# Patient Record
Sex: MALE | Race: WHITE | Employment: OTHER | URBAN - METROPOLITAN AREA
[De-identification: names, ages, dates, MRNs, and addresses within clinical notes are randomized per-mention and may not be internally consistent; named-entity substitution may affect disease eponyms.]

---

## 2019-04-25 ENCOUNTER — OFFICE VISIT (OUTPATIENT)
Dept: ORTHOPEDIC SURGERY | Age: 84
End: 2019-04-25

## 2019-04-25 VITALS
HEIGHT: 70 IN | WEIGHT: 184 LBS | TEMPERATURE: 97.6 F | BODY MASS INDEX: 26.34 KG/M2 | OXYGEN SATURATION: 98 % | RESPIRATION RATE: 16 BRPM | SYSTOLIC BLOOD PRESSURE: 126 MMHG | HEART RATE: 100 BPM | DIASTOLIC BLOOD PRESSURE: 83 MMHG

## 2019-04-25 DIAGNOSIS — M54.59 MECHANICAL LOW BACK PAIN: ICD-10-CM

## 2019-04-25 DIAGNOSIS — M54.16 LUMBAR RADICULOPATHY: ICD-10-CM

## 2019-04-25 DIAGNOSIS — Z96.642 HISTORY OF LEFT HIP REPLACEMENT: Primary | ICD-10-CM

## 2019-04-25 DIAGNOSIS — M25.552 PAIN IN LEFT HIP: ICD-10-CM

## 2019-04-25 NOTE — PROGRESS NOTES
Patient: Micha Keita                MRN: [id number]       SSN: [id number] YOB: 1930        AGE: 80 y.o. SEX: male Body mass index is 26.4 kg/m². PCP: Adam Giraldo MD 
04/25/19 HISTORY:  I had the pleasure to see the patient. It has been a couple of years_______________. Clinically he has some spinal stenosis with radiculopathy. He has had a revision of the femoral component, occasionally has some thigh pain. He reports that as the day goes on, the legs get a little bit weaker. The physical therapy that you ordered really helped him quite a bit. PHYSICAL EXAMINATION:  He denies much in the way of start-up pain. I watched him walk today. Mild Trendelenburg gait. Mildly ataxic as well, but not severely so. No true start-up discomfort. The hips rotate adequately. He is able to cross his legs actually. He has a couple small abrasions over the trochanter. It looks like it is healing adequately and there is no evidence for infection or DVT. He had an anterior ganglion cyst at one point and the incision he showed me looks like it is well-healed as well. RADIOGRAPHS:  X-rays shows that the revision, likely Ken implants, are stable. PLAN:  He has fairly significant neuropathy. It is a little worse on the left than on the right and I suspect he has radiculopathy and spinal stenosis and foraminal stenosis. I have offered an MRI for his back and some treatment. He would like to just see how things go. I would expect some activity-related thigh pain for longer distances, however the implants are not loose and I would expect some radiculopathy and some weakness due to his spinal stenosis. If he would like an MRI and some treatment for it, I would be happy to arrange it. He would like to see how things go. I'll see him back in a years' time. If he would like some help with the back and radiculopathy we can certainly help him sooner. REVIEW OF SYSTEMS:   
 
CON: negative for weight loss, fever EYE: negative for double vision ENT: negative for hoarseness RS:   negative for Tb 
GI:    negative for blood in stool :  negative for blood in urine Other systems reviewed and noted below. Past Medical History:  
Diagnosis Date  Appendicitis 1963  Hyperlipemia  Hypertension Family History Problem Relation Age of Onset  Elevated Lipids Mother  Cancer Father  Elevated Lipids Sister Current Outpatient Medications Medication Sig Dispense Refill  lisinopril (PRINIVIL, ZESTRIL) 20 mg tablet Take  by mouth daily.  FLUTICASONE PROPIONATE (FLUTICASONE NA) by Nasal route.  carvedilol (COREG) 3.125 mg tablet Take  by mouth two (2) times daily (with meals).  ezetimibe (ZETIA) 10 mg tablet Take  by mouth.  niacin 250 mg tablet Take 250 mg by mouth two (2) times a day. 3 tabs in the am and 3 tabs in the pm    
 atorvastatin (LIPITOR) 40 mg tablet Take  by mouth daily.  esomeprazole (NEXIUM) 40 mg capsule Take  by mouth daily.  multivitamin (ONE A DAY) tablet Take 1 Tab by mouth daily.  CALCIUM CITRATE (CITRACAL PO) Take  by mouth daily. With vitamin d 500 and calcium 400mg  VIT C/VIT E AC/LUT/COPPER/ZINC (PRESERVISION LUTEIN PO) Take  by mouth. Allergies Allergen Reactions  Penicillins Contact Dermatitis  Sulfa (Sulfonamide Antibiotics) Rash Past Surgical History:  
Procedure Laterality Date  HX BACK SURGERY  2003, 1/2013, 4/2013  
 lower back surgery [address] [address] [facility]  HX HIP REPLACEMENT Left 02/02/1991  
 HX MOHS PROCEDURES Left [facility] Hip exploratory surgery, thigh  [address] mendez tissue removed from base of skull  HX OTHER SURGICAL Left 1989  
 i & d of left hip wound  HX TURP  [id number] Social History Socioeconomic History  Marital status:  Spouse name: Not on file  Number of children: Not on file  Years of education: Not on file  Highest education level: Not on file Occupational History  Not on file Social Needs  Financial resource strain: Not on file  Food insecurity:  
  Worry: Not on file Inability: Not on file  Transportation needs:  
  Medical: Not on file Non-medical: Not on file Tobacco Use  Smoking status: Never Smoker  Smokeless tobacco: Never Used Substance and Sexual Activity  Alcohol use: Yes Alcohol/week: 1.2 oz Types: 2 Glasses of wine per week Comment: daily  Drug use: No  
 Sexual activity: Not on file Lifestyle  Physical activity:  
  Days per week: Not on file Minutes per session: Not on file  Stress: Not on file Relationships  Social connections:  
  Talks on phone: Not on file Gets together: Not on file Attends Alevism service: Not on file Active member of club or organization: Not on file Attends meetings of clubs or organizations: Not on file Relationship status: Not on file  Intimate partner violence:  
  Fear of current or ex partner: Not on file Emotionally abused: Not on file Physically abused: Not on file Forced sexual activity: Not on file Other Topics Concern  Not on file Social History Narrative  Not on file Visit Vitals /83 Pulse 100 Temp 97.6 °F (36.4 °C) (Oral) Resp 16 Ht 5' 10\" (1.778 m) Wt 184 lb (83.5 kg) SpO2 98% BMI 26.40 kg/m² PHYSICAL EXAMINATION: 
GENERAL: Alert and oriented x3, in no acute distress, well-developed, well-nourished, afebrile. HEART: No JVD. EYES: No scleral icterus NECK: No significant lymphadenopathy LUNGS: No respiratory compromise or indrawing ABDOMEN: Soft, non-tender, non-distended. Electronically signed by:  Pietro Young MD